# Patient Record
(demographics unavailable — no encounter records)

---

## 2024-10-22 NOTE — ASSESSMENT
[FreeTextEntry1] : The patient is a 67 yo woman presenting with right shoulder pain of 4 days. She was moving her mother into a nursing home and had to lift a few suitcases that caused pain to the shoulder. She denies pain before this. She has pain at night and with ADLs. She has primary pain laterally with radiation to the elbow. She has pain with activity and at rest. She denies paresthesias. She has pain lifting things to shoulder height and reaching overhead. She deneis loss of ROM. The patient has tried tylenol without relief.   RIght shoulder exam: The patient presents with no apparent distress. Neurovascularly intact. Sensation intact to right upper extremity. No scars, rashes, or abrasions. 2+ pulses to the distal radius.Tender to palpation at anterolateral shoulder and supraspinatus. AROM  ABD 90 ER 50 IR L5. 4/5 RC strength. 5/5 Deltoid strength. +Neer. +Jain.   Right shoulder xrays taken today in office, 5 views WB: No GHOA. No AC OA. GH joint is reduced.Type II acromion. No obvious tumors, masses, or lesions No fractures or loose bodies.  The patient has a right shoulder impingement syndrome. She received a right shoulder subacromial CSI. She will begin PT . She tolerated the injection well. She will call if not better in 4-6 weeks for MRI.

## 2024-10-22 NOTE — PROCEDURE
[Large Joint Injection] : Large joint injection [Right] : of the right [Shoulder] : shoulder [Pain] : pain [Inflammation] : inflammation [Alcohol] : alcohol [Betadine] : betadine [Ethyl Chloride sprayed topically] : ethyl chloride sprayed topically [Sterile technique used] : sterile technique used [___ cc    1%] : Lidocaine ~Vcc of 1%  [___ cc    0.25%] : Bupivacaine (Marcaine) ~Vcc of 0.25%  [___ cc    40mg] : Triamcinolone (Kenalog) ~Vcc of 40 mg  [] : Patient tolerated procedure well [Call if redness, pain or fever occur] : call if redness, pain or fever occur [Previous OTC use and PT nontherapeutic] : patient has tried OTC's including aspirin, Ibuprofen, Aleve, etc or prescription NSAIDS, and/or exercises at home and/or physical therapy without satisfactory response [Patient had decreased mobility in the joint] : patient had decreased mobility in the joint [Risks, benefits, alternatives discussed / Verbal consent obtained] : the risks benefits, and alternatives have been discussed, and verbal consent was obtained

## 2024-10-22 NOTE — HISTORY OF PRESENT ILLNESS
[] : yes [Sleep] : sleep [Meds] : meds [Lying in bed] : lying in bed [FreeTextEntry5] : 67 y/o F presents for NI trista of the Rt. shldr today. Pt reports onset of shldr pain last Friday after carrying a suitcase. Tylenol as needed. Limited ROM.  [FreeTextEntry7] : down to elbow

## 2025-05-28 NOTE — DISCUSSION/SUMMARY
[Medication Risks Reviewed] : Medication risks reviewed [de-identified] : reviewed the case and the imaging with the patient  lumbar radiculopathy  hAS LUMBAR scoliosis around 40 degrees  discussion of the condition and treatment options cautions discussed questions answered discussion of natural history of the condition and what the next step would be MRI l SPINE/FLEXERIL  pt  TPi toerlated well

## 2025-05-28 NOTE — HISTORY OF PRESENT ILLNESS
[6] : 6 [] : yes [de-identified] : 5.28.25:  67 yo F  Patient here for lower back pain shooting into both legs. Patient states pain started 2 weeks ago without injury. Patient states pain goes down both legs, left worse than right. Symptoms worse at night.  No loss of b/b control.   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

## 2025-05-28 NOTE — PROCEDURE
[Trigger point 1-2 muscle groups] : trigger point 1-2 muscle groups [Left] : of the left [Lumbar paraspinal muscle] : lumbar paraspinal muscle [Pain] : pain [Alcohol] : alcohol [Betadine] : betadine [Ethyl Chloride sprayed topically] : ethyl chloride sprayed topically [___ cc    1%] : Lidocaine ~Vcc of 1%  [___ cc    0.25%] : Bupivacaine (Marcaine) ~Vcc of 0.25%  [___ cc    10mg] : Triamcinolone (Kenalog) ~Vcc of 10 mg  [Risks, benefits, alternatives discussed / Verbal consent obtained] : the risks benefits, and alternatives have been discussed, and verbal consent was obtained [FreeTextEntry3] : the pain 8 before and 4 after the injection

## 2025-06-20 NOTE — HISTORY OF PRESENT ILLNESS
[6] : 6 [] : yes [de-identified] : 5.28.25:  67 yo F  Patient here for lower back pain shooting into both legs. Patient states pain started 2 weeks ago without injury. Patient states pain goes down both legs, left worse than right. Symptoms worse at night.  No loss of b/b control.   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 reviewed the case and the imaging with the patient lumbar radiculopathy hAS LUMBAR scoliosis around 40 degrees discussion of the condition and treatment options cautions discussed questions answered discussion of natural history of the condition and what the next step would be MRI l SPINE/FLEXERIL pt TPi toerlated well."  not much luck with injection or flexeril  no PT so far   MRi L spine - see report - [facility]  by my red - mass versus tumor at L2-3, stenosis at L4-5  [FreeTextEntry7] : both legs

## 2025-06-20 NOTE — DISCUSSION/SUMMARY
[Medication Risks Reviewed] : Medication risks reviewed [Surgical risks reviewed] : Surgical risks reviewed [de-identified] : reviewed the case and the imaging with the patient  discussion of the condition and treatment options cautions discussed questions answered discussion of natural history of the condition and what the next step would be MRi L spine with contrast to eval the disc versus nerve sheath tumor   The patient has tried conservative treatment for this condition including time/activity modification/various medications/therapy/injections without significant relief.  Do not suspect that further conservative treatment will lead to a resolution of symptoms.  Patient remains with persistent activity limited symptoms and is therefore considered for surgical intervention   shes not ready for surgery and would like to go back to PT   will fu after the MRi

## 2025-07-09 NOTE — HISTORY OF PRESENT ILLNESS
[6] : 6 [] : yes [de-identified] : 5.28.25:  69 yo F  Patient here for lower back pain shooting into both legs. Patient states pain started 2 weeks ago without injury. Patient states pain goes down both legs, left worse than right. Symptoms worse at night.  No loss of b/b control.   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 reviewed the case and the imaging with the patient lumbar radiculopathy hAS LUMBAR scoliosis around 40 degrees discussion of the condition and treatment options cautions discussed questions answered discussion of natural history of the condition and what the next step would be MRI l SPINE/FLEXERIL pt TPi toerlated well."  not much luck with injection or flexeril  no PT so far   MRi L spine - see report - [facility]  by my red - mass versus tumor at L2-3, stenosis at L4-5   07/08/25: Patient presents today to go over MRI results of the lumbar spine.  Plan at last was repeat MRI MRi L spine with contrast to eval the disc versus nerve sheath tumor"  [facility] PAIN WORSET THAT N LEGS   IMPRESSION L-SPINE from 07/02/25: 1. 5 mm enhancing lesion in the right L2-L3 lateral recess, indeterminate. Findings may reflect a sequestered disc fragment with inflammatory enhancement or a small nerve sheath tumor. This is exerting mass effect on the thecal sac and potentially impinging the right L3 nerve root. 2. Nonspecific enhancement around the L2 and L3 posterior spinous processes, possibly reflecting Charlton changes. 3. Otherwise, no significant interval change since region noncontrast MRI dated 6/3/2025.  [FreeTextEntry7] : both legs

## 2025-07-09 NOTE — DISCUSSION/SUMMARY
[Medication Risks Reviewed] : Medication risks reviewed [Surgical risks reviewed] : Surgical risks reviewed [de-identified] : reviewed the case and the imaging with the patient  BACK and leg  reviewed the MRis both w and wo contrast - my thought is most likely a disc fragment moreso versus a nerve sheath tumor - cant say for sure 100%  discussion of the condition and treatment options cautions discussed questions answered discussion of natural history of the condition and what the next step would be PT if that not working consider LESI  can keep an eye on the lesion at L2-3 repeat mri 6 months

## 2025-07-09 NOTE — HISTORY OF PRESENT ILLNESS
[6] : 6 [] : yes [de-identified] : 5.28.25:  69 yo F  Patient here for lower back pain shooting into both legs. Patient states pain started 2 weeks ago without injury. Patient states pain goes down both legs, left worse than right. Symptoms worse at night.  No loss of b/b control.   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 reviewed the case and the imaging with the patient lumbar radiculopathy hAS LUMBAR scoliosis around 40 degrees discussion of the condition and treatment options cautions discussed questions answered discussion of natural history of the condition and what the next step would be MRI l SPINE/FLEXERIL pt TPi toerlated well."  not much luck with injection or flexeril  no PT so far   MRi L spine - see report - [facility]  by my red - mass versus tumor at L2-3, stenosis at L4-5   07/08/25: Patient presents today to go over MRI results of the lumbar spine.  Plan at last was repeat MRI MRi L spine with contrast to eval the disc versus nerve sheath tumor"  [facility] PAIN WORSET THAT N LEGS   IMPRESSION L-SPINE from 07/02/25: 1. 5 mm enhancing lesion in the right L2-L3 lateral recess, indeterminate. Findings may reflect a sequestered disc fragment with inflammatory enhancement or a small nerve sheath tumor. This is exerting mass effect on the thecal sac and potentially impinging the right L3 nerve root. 2. Nonspecific enhancement around the L2 and L3 posterior spinous processes, possibly reflecting Piatt changes. 3. Otherwise, no significant interval change since region noncontrast MRI dated 6/3/2025.  [FreeTextEntry7] : both legs

## 2025-07-09 NOTE — DISCUSSION/SUMMARY
[Medication Risks Reviewed] : Medication risks reviewed [Surgical risks reviewed] : Surgical risks reviewed [de-identified] : reviewed the case and the imaging with the patient  BACK and leg  reviewed the MRis both w and wo contrast - my thought is most likely a disc fragment moreso versus a nerve sheath tumor - cant say for sure 100%  discussion of the condition and treatment options cautions discussed questions answered discussion of natural history of the condition and what the next step would be PT if that not working consider LESI  can keep an eye on the lesion at L2-3 repeat mri 6 months